# Patient Record
(demographics unavailable — no encounter records)

---

## 2024-10-11 NOTE — DATA REVIEWED
[de-identified] : Patient: COLT APRIL YOB: 1993 Phone: [phone] MRN: [id number] Acc: [id number] Date of Exam: 09- EXAM:  MRI LUMBAR SPINE WITHOUT CONTRAST HISTORY: Lower back pain TECHNIQUE: Multiplanar multisequence MRI lumbar spine. COMPARISON:  None FINDINGS: The lumbar vertebral bodies are normal in appearance and alignment. The intervertebral discs have a  normal appearance. No evidence of disc bulging or disc herniation is seen. There is no evidence of canal stenosis or  foraminal stenosis. The facet joints are unremarkable. The paravertebral soft tissues have a normal appearance. No  abnormalities are seen around the conus.  There is partial lumbarization of S1. There is a sacral root cyst at S1 to on the right which is felt to be incidental and  nonclinical. IMPRESSION: No focal disc herniation or spinal stenosis. 04-Sep-2017 04:24